# Patient Record
Sex: MALE | Race: WHITE | NOT HISPANIC OR LATINO | Employment: OTHER | URBAN - METROPOLITAN AREA
[De-identification: names, ages, dates, MRNs, and addresses within clinical notes are randomized per-mention and may not be internally consistent; named-entity substitution may affect disease eponyms.]

---

## 2020-01-08 ENCOUNTER — HOSPITAL ENCOUNTER (EMERGENCY)
Facility: HOSPITAL | Age: 65
Discharge: HOME | End: 2020-01-08
Attending: EMERGENCY MEDICINE
Payer: MEDICARE

## 2020-01-08 VITALS
HEART RATE: 68 BPM | DIASTOLIC BLOOD PRESSURE: 84 MMHG | TEMPERATURE: 97.6 F | OXYGEN SATURATION: 97 % | SYSTOLIC BLOOD PRESSURE: 174 MMHG | RESPIRATION RATE: 18 BRPM | WEIGHT: 176 LBS

## 2020-01-08 DIAGNOSIS — I80.9 PHLEBITIS: Primary | ICD-10-CM

## 2020-01-08 PROCEDURE — 99282 EMERGENCY DEPT VISIT SF MDM: CPT | Performed by: EMERGENCY MEDICINE

## 2020-01-08 PROCEDURE — 99282 EMERGENCY DEPT VISIT SF MDM: CPT

## 2020-01-08 RX ORDER — ASPIRIN 325 MG
325 TABLET ORAL ONCE
Status: COMPLETED | OUTPATIENT
Start: 2020-01-08 | End: 2020-01-08

## 2020-01-08 RX ADMIN — ASPIRIN 325 MG: 325 TABLET ORAL at 01:31

## 2020-01-08 NOTE — DISCHARGE INSTRUCTIONS
Phlebitis   WHAT YOU NEED TO KNOW:   Phlebitis is inflammation of the wall of your vein  Inflammation may be caused by damage to or infection of your vein  Phlebitis may occur in a vein in your arm or leg  Symptoms include pain, redness, and swelling near the vein  Symptoms may appear when you are receiving an IV medication, or 48 to 96 hours after you receive the medicine  DISCHARGE INSTRUCTIONS:   Return to the emergency department if:   · Your leg or arm turns pale or blue  · Your leg or arm feels hot or cold  · Your arm or leg feels warm, tender, and painful  It may look swollen and red  Contact your healthcare provider if:   · You have a fever  · You have more pain, swelling, or warmth near your vein  · Your symptoms do not improve within 72 hours  · You have questions or concerns about your condition or care  Medicines:   · NSAIDs , such as ibuprofen, help decrease swelling, pain, and fever  NSAIDs can cause stomach bleeding or kidney problems in certain people  If you take blood thinner medicine, always ask your healthcare provider if NSAIDs are safe for you  Always read the medicine label and follow directions  · Take your medicine as directed  Contact your healthcare provider if you think your medicine is not helping or if you have side effects  Tell him of her if you are allergic to any medicine  Keep a list of the medicines, vitamins, and herbs you take  Include the amounts, and when and why you take them  Bring the list or the pill bottles to follow-up visits  Carry your medicine list with you in case of an emergency  Follow up with your healthcare provider as directed:  Write down your questions so you remember to ask them during your visits  Manage your symptoms:   · Apply a warm compress to your vein  This will help decrease swelling and pain  Wet a washcloth in warm water  Do not  use hot water  Apply the warm compress for 10 minutes  Repeat this 4 times each day  · Elevate your leg or arm above the level of your heart as often as you can  This will help decrease swelling and pain  Prop your leg or arm on pillows or blankets to keep it elevated comfortably  · Wear pressure stockings if directed  Pressure stockings improve blood flow and help decrease pain and swelling  Pressure stockings can also help decrease your risk for blood clots in your legs  Wear the stockings during the day  Do not wear them overnight when you sleep  · Do not inject illegal drugs  Talk to your healthcare provider if you use IV drugs and need help to quit  © 2017 [employer] [facility] Information is for End User's use only and may not be sold, redistributed or otherwise used for commercial purposes  All illustrations and images included in CareNotes® are the copyrighted property of [employer]  or Kenneth Callaway  The above information is an  only  It is not intended as medical advice for individual conditions or treatments  Talk to your doctor, nurse or pharmacist before following any medical regimen to see if it is safe and effective for you

## 2020-01-08 NOTE — ED PROVIDER NOTES
History  Chief Complaint   Patient presents with    Skin Problem     patient had appendectomy on monday  has been having increased swelling, redness and pain where IV was placed  Pt is a 74year old male with recent appendectomy 1/6/19 presenting with redness and pain of his right dorsal forearm  Pt states since his IV was removed he has had increased redness, swelling and pain in the vein that the IV was in  Denies fevers, decreased ROM, strength, n/v/d  Denies chest pain, abdominal pain, SOB  None       History reviewed  No pertinent past medical history  Past Surgical History:   Procedure Laterality Date    APPENDECTOMY         History reviewed  No pertinent family history  I have reviewed and agree with the history as documented  Social History     Tobacco Use    Smoking status: Never Smoker    Smokeless tobacco: Never Used   Substance Use Topics    Alcohol use: Not Currently    Drug use: Never        Review of Systems   Constitutional: Negative  HENT: Negative  Respiratory: Negative  Cardiovascular: Negative  Gastrointestinal: Negative  Genitourinary: Negative  Musculoskeletal: Positive for arthralgias  Negative for joint swelling  Skin: Positive for color change  Negative for wound  Neurological: Negative  Physical Exam  Physical Exam   Constitutional: He is oriented to person, place, and time  He appears well-developed and well-nourished  No distress  HENT:   Head: Normocephalic and atraumatic  Right Ear: External ear normal    Left Ear: External ear normal    Nose: Nose normal    Eyes: Conjunctivae and EOM are normal    Neck: Normal range of motion  Neck supple  Cardiovascular: Normal rate, regular rhythm, normal heart sounds and intact distal pulses  Pulses:       Radial pulses are 2+ on the right side, and 2+ on the left side  Pulmonary/Chest: Effort normal and breath sounds normal    Musculoskeletal: Normal range of motion  Right elbow: Normal        Right wrist: Normal         Right forearm: He exhibits tenderness and swelling  He exhibits no bony tenderness, no edema, no deformity and no laceration  Erythema, swelling, tenderness along vein of dorsal right forearm from prior IV placement  Normal ROM and strength of RUE  Neurovascularly in tact  Neurological: He is alert and oriented to person, place, and time  Skin: Skin is warm and dry  He is not diaphoretic  Vital Signs  ED Triage Vitals [01/08/20 0114]   Temperature Pulse Respirations Blood Pressure SpO2   97 6 °F (36 4 °C) 68 18 (!) 174/84 97 %      Temp src Heart Rate Source Patient Position - Orthostatic VS BP Location FiO2 (%)   -- -- -- -- --      Pain Score       --           Vitals:    01/08/20 0114   BP: (!) 174/84   Pulse: 68         Visual Acuity      ED Medications  Medications - No data to display    Diagnostic Studies  Results Reviewed     None                 No orders to display              Procedures  Procedures         ED Course                               MDM      Disposition  Final diagnoses:   Phlebitis     Time reflects when diagnosis was documented in both MDM as applicable and the Disposition within this note     Time User Action Codes Description Comment    1/8/2020  1:15 AM Keagan Landeros [I80 [id number]] Phlebitis       ED Disposition     ED Disposition Condition Date/Time Comment    Discharge Good Wed Jan 8, 2020  1:14 AM Brittany Ritter discharge to home/self care              Follow-up Information     Follow up With Specialties Details Why Contact Info Additional Information    [facility] Schedule an appointment as soon as possible for a visit  As needed [address] [address]  [id number] [facility]  [address] Brianna HERNANDEZ, [address] [facility] [address] [phone]          Patient's Medications    No medications on file     No discharge procedures on file     ED Provider  Electronically Signed by           Stephanie Hodge PA-C  01/08/20 5791